# Patient Record
Sex: FEMALE
[De-identification: names, ages, dates, MRNs, and addresses within clinical notes are randomized per-mention and may not be internally consistent; named-entity substitution may affect disease eponyms.]

---

## 2023-01-17 PROBLEM — Z00.00 ENCOUNTER FOR PREVENTIVE HEALTH EXAMINATION: Status: ACTIVE | Noted: 2023-01-17

## 2023-03-31 ENCOUNTER — APPOINTMENT (OUTPATIENT)
Dept: ENDOCRINOLOGY | Facility: CLINIC | Age: 78
End: 2023-03-31
Payer: MEDICARE

## 2023-03-31 ENCOUNTER — NON-APPOINTMENT (OUTPATIENT)
Age: 78
End: 2023-03-31

## 2023-03-31 VITALS
WEIGHT: 126 LBS | DIASTOLIC BLOOD PRESSURE: 86 MMHG | HEIGHT: 60 IN | HEART RATE: 69 BPM | BODY MASS INDEX: 24.74 KG/M2 | SYSTOLIC BLOOD PRESSURE: 138 MMHG

## 2023-03-31 DIAGNOSIS — M81.0 AGE-RELATED OSTEOPOROSIS W/OUT CURRENT PATHOLOGICAL FRACTURE: ICD-10-CM

## 2023-03-31 DIAGNOSIS — E89.0 POSTPROCEDURAL HYPOTHYROIDISM: ICD-10-CM

## 2023-03-31 DIAGNOSIS — S32.000A WEDGE COMPRESSION FRACTURE OF UNSPECIFIED LUMBAR VERTEBRA, INITIAL ENCOUNTER FOR CLOSED FRACTURE: ICD-10-CM

## 2023-03-31 DIAGNOSIS — M19.90 UNSPECIFIED OSTEOARTHRITIS, UNSPECIFIED SITE: ICD-10-CM

## 2023-03-31 DIAGNOSIS — I10 ESSENTIAL (PRIMARY) HYPERTENSION: ICD-10-CM

## 2023-03-31 DIAGNOSIS — E78.5 HYPERLIPIDEMIA, UNSPECIFIED: ICD-10-CM

## 2023-03-31 DIAGNOSIS — E23.7 DISORDER OF PITUITARY GLAND, UNSPECIFIED: ICD-10-CM

## 2023-03-31 PROCEDURE — 99205 OFFICE O/P NEW HI 60 MIN: CPT

## 2023-03-31 RX ORDER — CALCIUM CITRATE/VITAMIN D3 315MG-6.25
TABLET ORAL
Refills: 0 | Status: ACTIVE | COMMUNITY

## 2023-03-31 RX ORDER — LOSARTAN POTASSIUM 50 MG/1
50 TABLET, FILM COATED ORAL
Refills: 0 | Status: ACTIVE | COMMUNITY

## 2023-03-31 RX ORDER — HYDROCHLOROTHIAZIDE 25 MG/1
25 TABLET ORAL
Refills: 0 | Status: ACTIVE | COMMUNITY

## 2023-03-31 RX ORDER — ATORVASTATIN CALCIUM 10 MG/1
10 TABLET, FILM COATED ORAL
Refills: 0 | Status: ACTIVE | COMMUNITY

## 2023-03-31 RX ORDER — ATENOLOL 25 MG/1
25 TABLET ORAL
Refills: 0 | Status: ACTIVE | COMMUNITY

## 2023-03-31 RX ORDER — ALENDRONATE SODIUM 70 MG/1
70 TABLET ORAL
Refills: 0 | Status: ACTIVE | COMMUNITY

## 2023-04-03 LAB
25(OH)D3 SERPL-MCNC: 46.4 NG/ML
ALBUMIN MFR SERPL ELPH: 59.5 %
ALBUMIN SERPL ELPH-MCNC: 4.2 G/DL
ALBUMIN SERPL-MCNC: 3.9 G/DL
ALBUMIN/GLOB SERPL: 1.4 RATIO
ALP BLD-CCNC: 58 U/L
ALPHA1 GLOB MFR SERPL ELPH: 3.4 %
ALPHA1 GLOB SERPL ELPH-MCNC: 0.2 G/DL
ALPHA2 GLOB MFR SERPL ELPH: 9.1 %
ALPHA2 GLOB SERPL ELPH-MCNC: 0.6 G/DL
ALT SERPL-CCNC: 22 U/L
ANION GAP SERPL CALC-SCNC: 13 MMOL/L
AST SERPL-CCNC: 27 U/L
B-GLOBULIN MFR SERPL ELPH: 10.2 %
B-GLOBULIN SERPL ELPH-MCNC: 0.7 G/DL
BASOPHILS # BLD AUTO: 0.04 K/UL
BASOPHILS NFR BLD AUTO: 0.6 %
BILIRUB SERPL-MCNC: 0.7 MG/DL
BUN SERPL-MCNC: 14 MG/DL
CALCIUM SERPL-MCNC: 9.8 MG/DL
CALCIUM SERPL-MCNC: 9.8 MG/DL
CHLORIDE SERPL-SCNC: 100 MMOL/L
CO2 SERPL-SCNC: 28 MMOL/L
CORTIS SERPL-MCNC: 9.9 UG/DL
CREAT SERPL-MCNC: 0.78 MG/DL
EGFR: 78 ML/MIN/1.73M2
EOSINOPHIL # BLD AUTO: 0.11 K/UL
EOSINOPHIL NFR BLD AUTO: 1.8 %
GAMMA GLOB FLD ELPH-MCNC: 1.2 G/DL
GAMMA GLOB MFR SERPL ELPH: 17.8 %
GLUCOSE SERPL-MCNC: 108 MG/DL
HCT VFR BLD CALC: 45.1 %
HGB BLD-MCNC: 15.1 G/DL
IGF-1 INTERP: NORMAL
IGF-I BLD-MCNC: 79 NG/ML
IMM GRANULOCYTES NFR BLD AUTO: 0.3 %
INTERPRETATION SERPL IEP-IMP: NORMAL
LYMPHOCYTES # BLD AUTO: 1.83 K/UL
LYMPHOCYTES NFR BLD AUTO: 29.4 %
MAGNESIUM SERPL-MCNC: 2 MG/DL
MAN DIFF?: NORMAL
MCHC RBC-ENTMCNC: 31.9 PG
MCHC RBC-ENTMCNC: 33.5 GM/DL
MCV RBC AUTO: 95.3 FL
MONOCYTES # BLD AUTO: 0.44 K/UL
MONOCYTES NFR BLD AUTO: 7.1 %
NEUTROPHILS # BLD AUTO: 3.78 K/UL
NEUTROPHILS NFR BLD AUTO: 60.8 %
PARATHYROID HORMONE INTACT: 16 PG/ML
PHOSPHATE SERPL-MCNC: 4 MG/DL
PLATELET # BLD AUTO: 191 K/UL
POTASSIUM SERPL-SCNC: 3.5 MMOL/L
PROLACTIN SERPL-MCNC: 10.9 NG/ML
PROT SERPL-MCNC: 6.6 G/DL
RBC # BLD: 4.73 M/UL
RBC # FLD: 14.5 %
SODIUM SERPL-SCNC: 141 MMOL/L
T4 FREE SERPL-MCNC: 1.9 NG/DL
THYROGLOB AB SERPL-ACNC: <20 IU/ML
THYROGLOB SERPL-MCNC: <0.2 NG/ML
TSH SERPL-ACNC: 0.25 UIU/ML
WBC # FLD AUTO: 6.22 K/UL

## 2023-04-03 RX ORDER — LEVOTHYROXINE SODIUM 88 UG/1
88 CAPSULE ORAL
Refills: 0 | Status: DISCONTINUED | COMMUNITY
End: 2023-04-03

## 2023-04-04 ENCOUNTER — NON-APPOINTMENT (OUTPATIENT)
Age: 78
End: 2023-04-04

## 2023-04-04 LAB — ALP BONE SERPL-MCNC: 7.2 UG/L

## 2023-04-10 ENCOUNTER — NON-APPOINTMENT (OUTPATIENT)
Age: 78
End: 2023-04-10

## 2023-04-11 ENCOUNTER — NON-APPOINTMENT (OUTPATIENT)
Age: 78
End: 2023-04-11

## 2023-04-11 LAB
ALBUPE: 18 %
ALPHA1UPE: 28.5 %
ALPHA2UPE: 21.6 %
BETAUPE: 16.3 %
GAMMAUPE: 15.6 %
IGA 24H UR QL IFE: NORMAL
KAPPA LC 24H UR QL: NORMAL
PROT PATTERN 24H UR ELPH-IMP: NORMAL
PROT UR-MCNC: 9 MG/DL
PROT UR-MCNC: 9 MG/DL

## 2023-04-11 NOTE — HISTORY OF PRESENT ILLNESS
[FreeTextEntry1] : Ms. Ramirez is a 78 year-old woman presenting to establish care with me for osteoporosis, pituitary abnormality, and history of thyroid cancer with postoperative hypothyroidism. She is accompanied by her daughter, who translated as needed. \par \par Bone History\par Osteoporosis diagnosed in 2021 on routine bone density significant for T-scores of -2.7 at the lumbar spine, -2.5 at the femoral neck, and -1.7 at the total hip\par Fracture history: L1 compression fracture in a fall from standing height in 2022; she is considering kyphoplasty due to persistent pain\par Family history: No parental history of hip fracture\par Treatment: Alendronate 70 mg weekly from 2021 to present\par \par Falls: No\par Height loss: About two inches\par Kidney stones: No\par Dental health: Irregular appointments, no history of implants, no upcoming procedures planned\par Exercise: Walks, in physical therapy for back pain\par Dairy intake: 1 serving daily (glass of milk daily)\par Calcium supplements: Nature's Bounty 600 mg daily\par Multivitamin: None\par Vitamin D supplements: in calcium supplement\par \par Osteoporosis risk factors include: Postmenopausal status,  race, prior fracture, falls, height loss, small thin bones, tobacco use, excessive alcohol, anorexia, family history, vitamin D deficiency, corticosteroid use, seizure medications, malabsorption, hyperparathyroidism, hyperthyroidism.\par NEGATIVE EXCEPT: Postmenopausal status, prior fracture \par \par Pituitary abnormality. \par She was incidentally diagnosed with a mass in her pituitary gland around 2019; no records available. No subsequent imaging.\par No known history of pituitary hormone testing.\par \par History of thyroid cancer. Postoperative hypothyroidism.\par She is status post total thyroidectomy for a thyroid nodule around 2011 in [place] with pathology significant for thyroid cancer; no records available. \par She has been taking levothyroxine 88 mcg daily.

## 2023-04-11 NOTE — PHYSICAL EXAM
[Alert] : alert [Healthy Appearance] : healthy appearance [No Acute Distress] : no acute distress [Normal Sclera/Conjunctiva] : normal sclera/conjunctiva [Normal Hearing] : hearing was normal [No Neck Mass] : no neck mass was observed [No LAD] : no lymphadenopathy [Supple] : the neck was supple [No Respiratory Distress] : no respiratory distress [Normal S1, S2] : normal S1 and S2 [Normal Rate] : heart rate was normal [Regular Rhythm] : with a regular rhythm [No Stigmata of Cushings Syndrome] : no stigmata of Cushings Syndrome [Normal Gait] : normal gait [Normal Insight/Judgement] : insight and judgment were intact [Kyphosis] : no kyphosis present [Scoliosis] : no scoliosis [Acanthosis Nigricans] : no acanthosis nigricans [de-identified] : no palpable thyroid tissue [de-identified] : + vertebral tenderness [de-identified] : no moon facies, no supraclavicular fat pads [de-identified] : + difficulty balancing on one leg bilaterally

## 2023-04-11 NOTE — ASSESSMENT
[FreeTextEntry1] : Osteoporosis. She has a history of fragility fracture. She has a history of alendronate therapy from 2021 to present. We discussed completion of a metabolic evaluation for secondary causes of bone loss. We discussed the potential benefits and risks of the osteoanabolic and antiresorptive classes of pharmacologic osteoporosis therapy, with a focus on the antiresorptive class per her preference. While a single fracture on therapy is not considered a clear treatment failure, we discussed switching from alendronate to denosumab and she is amenable pending insurance authorization. We discussed the potential benefits and risks of antiresorptive osteoporosis therapy, including but not limited to osteonecrosis of the jaw and atypical femoral fracture. We discussed that denosumab must be dosed every 6 months due to rebound increase in bone breakdown with abrupt discontinuation of therapy, with transition to bisphosphonate therapy prior to a "drug holiday." We discussed that kyphoplasty can increase the risk for vertebral fractures in the surrounding vertebrae.\par Metabolic evaluation for secondary causes of osteoporosis\par Send 24 hour urine calcium to evaluate for hypercalciuria and calcium sufficiency\par Calcium 4204-4250 mg daily from diet and supplements (to be taken in divided doses as no more than 500-600 mg can be absorbed at one time)\par Continue current vitamin D regimen pending level\par Diet, exercise and fall prevention discussed\par \par Pituitary abnormality. We will send a pituitary hormone panel and obtain interval pituitary imaging.\par \par History of thyroid cancer. Postoperative hypothyroidism. We will send TSH and a thyroglobulin level. We will continue levothyroxine 88 mcg daily pending results. \par \par I reviewed the DXA performed on May 26, 2021 with the patient today.\par I reviewed the laboratories performed on April 22, 2023 with the patient today. \par I counseled the patient regarding calcium and vitamin D intake today.\par I discussed the following osteoporosis therapies: Raloxifene, alendronate, risedronate, ibandronate, zoledronic acid, denosumab, teriparatide, abaloparatide \par \par CC:\par Dr. Hugo Ramirez, Fax [phone]\par Dr. Ebony Garcia, Fax [phone]

## 2023-04-11 NOTE — DATA REVIEWED
[FreeTextEntry1] : Laboratories (April 22, 2023) reviewed and significant for: \par Unremarkable complete blood count\par Calcium 8.4 mg/dL (albumin 3.8 g/dL)\par BUN/creatinine 19/0.77 mg/dL (eGFR 74 mL/min)\par Alkaline phosphatase 86 U/L\par TSH 1.53 uIU/mL\par 25-hydroxyvitamin D 67 ng/mL\par \par Magnetic resonance imaging (January 26, 2023) reviewed and significant for: \par Acute on chronic L1 compression deformity of progression of vertebral body collapse from 4/22/2022. Mild retropulsion of the superior endplate.\par Mild multilevel degenerative changes throughout the lumbar spine, including mild spinal canal narrowing at L4-5 with narrowing of both lateral recesses. Correlate clinically for L5 neuropathy. \par \par Most recent bone mineral density\par Date: May 26, 2021\par Source: Hologic\par Site: [facility]\par \par Site	BMD	T-score	Change previous	Change baseline	\par Lumbar spine	0.854	-2.7			\par Femoral neck	0.696	-2.5			\par Total hip                  	0.789	-1.7			\par Distal radius					\par DXA comments: Baseline scan at this facility; left hip values

## 2023-04-11 NOTE — ADDENDUM
[FreeTextEntry1] : Recent laboratory results as below, overall unremarkable for a secondary cause of bone loss to date; discussed with Ms. Marianne Ramirez. TSH below goal and recommended adjustment in levothyroxine from 88 to 75 mcg. We will plan to repeat TSH in 8-12 weeks. Thyroglobulin undetectable with negative antibodies, which is reassuring. Other pituitary hormone values within the normal range. Results pending. 4/03/23\par \par Metabolic evaluation otherwise unremarkable for a secondary cause of bone loss. 4/11/23

## 2023-05-02 ENCOUNTER — NON-APPOINTMENT (OUTPATIENT)
Age: 78
End: 2023-05-02

## 2023-07-18 ENCOUNTER — NON-APPOINTMENT (OUTPATIENT)
Age: 78
End: 2023-07-18

## 2023-07-26 ENCOUNTER — NON-APPOINTMENT (OUTPATIENT)
Age: 78
End: 2023-07-26

## 2023-08-08 ENCOUNTER — APPOINTMENT (OUTPATIENT)
Dept: ENDOCRINOLOGY | Facility: CLINIC | Age: 78
End: 2023-08-08
Payer: MEDICARE

## 2023-08-08 PROCEDURE — 96372 THER/PROPH/DIAG INJ SC/IM: CPT

## 2023-09-19 ENCOUNTER — RX RENEWAL (OUTPATIENT)
Age: 78
End: 2023-09-19

## 2023-09-19 RX ORDER — LEVOTHYROXINE SODIUM 0.07 MG/1
75 TABLET ORAL
Qty: 90 | Refills: 0 | Status: ACTIVE | COMMUNITY
Start: 2023-04-03 | End: 1900-01-01

## 2024-03-06 ENCOUNTER — APPOINTMENT (OUTPATIENT)
Dept: ENDOCRINOLOGY | Facility: CLINIC | Age: 79
End: 2024-03-06
Payer: MEDICAID

## 2024-03-06 PROCEDURE — 96372 THER/PROPH/DIAG INJ SC/IM: CPT

## 2024-03-06 RX ORDER — DENOSUMAB 60 MG/ML
60 INJECTION SUBCUTANEOUS
Qty: 1 | Refills: 0 | Status: COMPLETED | OUTPATIENT
Start: 2024-03-06

## 2024-03-06 RX ADMIN — DENOSUMAB 60 MG/ML: 60 INJECTION SUBCUTANEOUS at 00:00

## 2024-09-10 ENCOUNTER — APPOINTMENT (OUTPATIENT)
Dept: ENDOCRINOLOGY | Facility: CLINIC | Age: 79
End: 2024-09-10
Payer: MEDICARE

## 2024-09-10 VITALS
BODY MASS INDEX: 26.95 KG/M2 | HEART RATE: 69 BPM | SYSTOLIC BLOOD PRESSURE: 137 MMHG | DIASTOLIC BLOOD PRESSURE: 83 MMHG | WEIGHT: 138 LBS

## 2024-09-10 DIAGNOSIS — M81.0 AGE-RELATED OSTEOPOROSIS W/OUT CURRENT PATHOLOGICAL FRACTURE: ICD-10-CM

## 2024-09-10 DIAGNOSIS — S32.000A WEDGE COMPRESSION FRACTURE OF UNSPECIFIED LUMBAR VERTEBRA, INITIAL ENCOUNTER FOR CLOSED FRACTURE: ICD-10-CM

## 2024-09-10 DIAGNOSIS — E23.7 DISORDER OF PITUITARY GLAND, UNSPECIFIED: ICD-10-CM

## 2024-09-10 DIAGNOSIS — E89.0 POSTPROCEDURAL HYPOTHYROIDISM: ICD-10-CM

## 2024-09-10 DIAGNOSIS — E55.9 VITAMIN D DEFICIENCY, UNSPECIFIED: ICD-10-CM

## 2024-09-10 PROCEDURE — 96372 THER/PROPH/DIAG INJ SC/IM: CPT

## 2024-09-10 PROCEDURE — G2211 COMPLEX E/M VISIT ADD ON: CPT | Mod: NC

## 2024-09-10 PROCEDURE — 99214 OFFICE O/P EST MOD 30 MIN: CPT | Mod: 25

## 2024-09-10 RX ORDER — DENOSUMAB 60 MG/ML
60 INJECTION SUBCUTANEOUS
Qty: 1 | Refills: 0 | Status: COMPLETED | OUTPATIENT
Start: 2024-09-10

## 2024-09-10 RX ADMIN — DENOSUMAB 60 MG/ML: 60 INJECTION SUBCUTANEOUS at 00:00

## 2024-09-10 NOTE — DATA REVIEWED
[FreeTextEntry1] : Laboratories (August 15, 2024) reviewed and significant for:  Unremarkable complete blood count Calcium 8.0 mg/dL (albumin 4.4 g/dL) BUN/creatinine 16.1/0.81 mg/dL (eGFR 68.2 mL/min) Alkaline phosphatase 64 U/L TSH 4.730 uIU/mL (normal: 0.450-4.500)  Magnetic resonance imaging (January 26, 2023) reviewed and significant for:  Acute on chronic L1 compression deformity of progression of vertebral body collapse from 4/22/2022. Mild retropulsion of the superior endplate. Mild multilevel degenerative changes throughout the lumbar spine, including mild spinal canal narrowing at L4-5 with narrowing of both lateral recesses. Correlate clinically for L5 neuropathy.   Most recent bone mineral density Date: July 7, 2023 Source: We Site: [facility]  Site	BMD	T-score	Change previous	Change baseline	 Lumbar spine	0.754	-2.9		-1.8%#	 Femoral neck	0.604	-2.3			 Total hip           0.750	-1.6		-2.8%#	 Distal radius	0.481	-3.5			 DXA comments: #Dissimilar scan analysis; left hip values

## 2024-09-10 NOTE — ASSESSMENT
We are getting labs and a chest x-ray today, I will call you with results.  We are scheduling a follow-up with Dr. To today for her next available.  I would like you to call [phone] and ask to schedule an appointment with your cardiologist Dr. Long for a hospital follow-up.  Keep all other upcoming appointments as scheduled.   [FreeTextEntry1] : Osteoporosis. She has a history of fragility fracture. She has a history of alendronate therapy from 2021 to July 2023, and denosumab from August 2023 to present. Metabolic evaluation for secondary causes of bone loss previously unremarkable. We have discussed the potential benefits and risks of antiresorptive osteoporosis therapy, including but not limited to osteonecrosis of the jaw and atypical femoral fracture. She is tolerating denosumab and we will continue. We discussed that denosumab must be dosed every 6 months due to rebound increase in bone breakdown with abrupt discontinuation of therapy, with transition to bisphosphonate therapy prior to a "drug holiday." Her most recent serum calcium value was borderline low, likely due to a laboratory anomaly in the absence of symptoms, and we will monitor. Continue denosumab 60 mg SC every 6 months; dose administered today Calcium 5793-3961 mg daily from diet and supplements (to be taken in divided doses as no more than 500-600 mg can be absorbed at one time); advised increased dietary calcium Continue current vitamin D regimen pending level Diet, exercise and fall prevention discussed  Pituitary abnormality. Pituitary hormone panel previously within range. No clear indication for pituitary imaging in the absence of symptoms.   History of thyroid cancer. Postoperative hypothyroidism. 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 We will adjust her dose of levothyroxine as below. We have reviewed proper use and compliance with levothyroxine.  Adjust levothyroxine to 88 mcg, 1 pill 6 days/week and 0.5 pill 1 day/week  Tremor. I referred her to neurology providers within the [facility].   I reviewed the DXA performed on July 7, 2023 with the patient today. I reviewed the laboratories performed from March 31, 2023 to present with the patient today.  I counseled the patient regarding calcium and vitamin D intake today. I discussed the following osteoporosis therapies: Denosumab  CC: Dr. Hugo Ramirez, Fax [phone] Dr. Ebony Garcia, Fax [phone]

## 2024-09-10 NOTE — ADDENDUM
[FreeTextEntry1] : Procedure Note: Denosumab 60 mg SC administered into left upper arm. Flores Klein MD

## 2024-09-10 NOTE — PHYSICAL EXAM
[Alert] : alert [Healthy Appearance] : healthy appearance [No Acute Distress] : no acute distress [Normal Sclera/Conjunctiva] : normal sclera/conjunctiva [Normal Hearing] : hearing was normal [No Respiratory Distress] : no respiratory distress [No Stigmata of Cushings Syndrome] : no stigmata of Cushings Syndrome [Normal Gait] : normal gait [Normal Insight/Judgement] : insight and judgment were intact [Visual Fields Grossly Intact] : visual fields grossly intact [Kyphosis] : no kyphosis present [Acanthosis Nigricans] : no acanthosis nigricans [de-identified] : no moon facies, no supraclavicular fat pads

## 2024-09-10 NOTE — HISTORY OF PRESENT ILLNESS
[FreeTextEntry1] : Ms. Ramirez is a 79 year-old woman presenting to establish care with me for osteoporosis, pituitary abnormality, and history of thyroid cancer with postoperative hypothyroidism. She is accompanied by her daughter, who translated as needed.   Bone History Osteoporosis diagnosed in 2021 on routine bone density significant for T-scores of -2.7 at the lumbar spine, -2.5 at the femoral neck, and -1.7 at the total hip Fracture history: L1 compression fracture in a fall from standing height in 2022; she is considering kyphoplasty due to persistent pain Family history: No parental history of hip fracture Treatment:  Alendronate 70 mg weekly from 2021 to July 2023 Denosumab 60 mg SC in August 2023, March 2024  Falls: None recent Height loss: About two inches Kidney stones: No Dental health: Irregular appointments, no history of implants, no upcoming procedures planned Exercise: Walks Dairy intake: 0-1 serving daily (occasional milk) Calcium supplements: Nature's Bounty 600 mg daily Multivitamin: None Vitamin D supplements: in calcium supplement  Osteoporosis risk factors include: Postmenopausal status,  race, prior fracture, falls, height loss, small thin bones, tobacco use, excessive alcohol, anorexia, family history, vitamin D deficiency, corticosteroid use, seizure medications, malabsorption, hyperparathyroidism, hyperthyroidism. NEGATIVE EXCEPT: Postmenopausal status, prior fracture   Pituitary abnormality.  She was incidentally diagnosed with a mass in her pituitary gland around 2019; no records available. No subsequent imaging. No known history of pituitary hormone testing.  History of thyroid cancer. Postoperative hypothyroidism. She is status post total thyroidectomy for a thyroid nodule around 2011 in [place] with pathology significant for thyroid cancer; no records available.  She has been taking levothyroxine 88 mcg daily.  Interim History  Laboratory results from her initial visit as below, overall unremarkable for a secondary cause of bone loss. TSH below goal and recommended adjustment in levothyroxine from 88 to 75 mcg. Thyroglobulin undetectable with negative antibodies, which is reassuring. Other pituitary hormone values within the normal range. Metabolic evaluation otherwise unremarkable for a secondary cause of bone loss.  She has received denosumab from August 2023 to present.  Recent laboratory results ordered by her son significant for the below; see scanned report. Her dose of levothyroxine was recently adjusted to 88 mcg daily due to TSH 4.730 uIU/mL (normal: 0.450-4.500). Serum calcium borderline low. Renal function within range. She has had an intentional tremor. She has had polyuria. No acute issues. Medical and surgical history, medications, allergies, social and family history reviewed and updated as needed.

## 2025-02-27 ENCOUNTER — NON-APPOINTMENT (OUTPATIENT)
Age: 80
End: 2025-02-27

## 2025-03-18 RX ADMIN — DENOSUMAB 60 MILLIGRAM(S): 60 INJECTION SUBCUTANEOUS at 18:10

## 2025-03-24 ENCOUNTER — OUTPATIENT (OUTPATIENT)
Dept: OUTPATIENT SERVICES | Facility: HOSPITAL | Age: 80
LOS: 1 days | End: 2025-03-24
Payer: MEDICARE

## 2025-03-24 ENCOUNTER — APPOINTMENT (OUTPATIENT)
Dept: INFUSION THERAPY | Facility: CLINIC | Age: 80
End: 2025-03-24

## 2025-03-24 VITALS
WEIGHT: 141.98 LBS | SYSTOLIC BLOOD PRESSURE: 133 MMHG | HEIGHT: 59 IN | HEART RATE: 70 BPM | RESPIRATION RATE: 18 BRPM | DIASTOLIC BLOOD PRESSURE: 76 MMHG | TEMPERATURE: 98 F | OXYGEN SATURATION: 95 %

## 2025-03-24 DIAGNOSIS — M81.0 AGE-RELATED OSTEOPOROSIS WITHOUT CURRENT PATHOLOGICAL FRACTURE: ICD-10-CM

## 2025-03-24 RX ORDER — DENOSUMAB 60 MG/ML
60 INJECTION SUBCUTANEOUS ONCE
Refills: 0 | Status: COMPLETED | OUTPATIENT
Start: 2025-03-24 | End: 2025-03-18

## 2025-08-22 ENCOUNTER — LABORATORY RESULT (OUTPATIENT)
Age: 80
End: 2025-08-22

## 2025-08-23 ENCOUNTER — NON-APPOINTMENT (OUTPATIENT)
Age: 80
End: 2025-08-23

## 2025-08-26 ENCOUNTER — NON-APPOINTMENT (OUTPATIENT)
Age: 80
End: 2025-08-26

## 2025-09-09 ENCOUNTER — APPOINTMENT (OUTPATIENT)
Dept: ENDOCRINOLOGY | Facility: CLINIC | Age: 80
End: 2025-09-09
Payer: MEDICARE

## 2025-09-09 VITALS
DIASTOLIC BLOOD PRESSURE: 66 MMHG | HEART RATE: 78 BPM | BODY MASS INDEX: 26.9 KG/M2 | WEIGHT: 137 LBS | HEIGHT: 60 IN | SYSTOLIC BLOOD PRESSURE: 101 MMHG

## 2025-09-09 DIAGNOSIS — E89.0 POSTPROCEDURAL HYPOTHYROIDISM: ICD-10-CM

## 2025-09-09 DIAGNOSIS — S32.000A WEDGE COMPRESSION FRACTURE OF UNSPECIFIED LUMBAR VERTEBRA, INITIAL ENCOUNTER FOR CLOSED FRACTURE: ICD-10-CM

## 2025-09-09 DIAGNOSIS — E55.9 VITAMIN D DEFICIENCY, UNSPECIFIED: ICD-10-CM

## 2025-09-09 DIAGNOSIS — M81.0 AGE-RELATED OSTEOPOROSIS W/OUT CURRENT PATHOLOGICAL FRACTURE: ICD-10-CM

## 2025-09-09 DIAGNOSIS — E23.7 DISORDER OF PITUITARY GLAND, UNSPECIFIED: ICD-10-CM

## 2025-09-09 PROCEDURE — G2211 COMPLEX E/M VISIT ADD ON: CPT

## 2025-09-09 PROCEDURE — 99214 OFFICE O/P EST MOD 30 MIN: CPT

## 2025-09-12 ENCOUNTER — APPOINTMENT (OUTPATIENT)
Dept: INFUSION THERAPY | Facility: CLINIC | Age: 80
End: 2025-09-12